# Patient Record
(demographics unavailable — no encounter records)

---

## 2025-05-19 NOTE — HISTORY OF PRESENT ILLNESS
[Born at ___ Wks Gestation] : The patient was born at [unfilled] weeks gestation [] : via normal spontaneous vaginal delivery [(1) _____] : [unfilled] [(5) _____] : [unfilled] [BW: _____] : weight of [unfilled] [DW: _____] : Discharge weight was [unfilled] [Age: ___] : [unfilled] year old mother [G: ___] : G [unfilled] [P: ___] : P [unfilled] [Rubella (Immune)] : Rubella immune [MBT: ____] : MBT - [unfilled] [TcB: _____] : Transcutaneous Bilirubin [unfilled] mg/dL [HepBsAG] : HepBsAg negative [HIV] : HIV negative [GBS] : GBS negative [VDRL/RPR (Reactive)] : VDRL/RPR nonreactive [] : negative [FreeTextEntry9] : O+ [de-identified] : 36 [FreeTextEntry1] : 39.3  routine pregnancy and delivery  mom wanting to breastfeed exclusively feels like maybe milk is coming in more today, pumped 100ml today  mom has been trying to latch baby, pumping, and giving her sister's breast milk (lives at home with family) has had lots of urine/BM in last 24 hours, initially diapers with urate crystals sleeping in bassinet but mom admitting to difficulty getting baby frequently at night for feeds and co-sleeping  takes 30-40ml/feed  mom asking about taking a plane to [place] to stay with her mom for awhile

## 2025-05-19 NOTE — PHYSICAL EXAM
[Alert] : alert [Acute Distress] : no acute distress [Normocephalic] : normocephalic [Flat Open Anterior [place]] : flat open anterior fontanelle [Normally Placed Ears] : normally placed ears [Auricles Well Formed] : auricles well formed [Clear Tympanic membranes] : clear tympanic membranes [Light reflex present] : light reflex present [Bony structures visible] : bony structures visible [Patent Auditory Canal] : patent auditory canal [Discharge] : no discharge [Nares Patent] : nares patent [Palate Intact] : palate intact [Uvula Midline] : uvula midline [Supple, full passive range of motion] : supple, full passive range of motion [Palpable Masses] : no palpable masses [Symmetric Chest Rise] : symmetric chest rise [Clear to Auscultation Bilaterally] : clear to auscultation bilaterally [Regular Rate and Rhythm] : regular rate and rhythm [S1, S2 present] : S1, S2 present [Murmurs] : no murmurs [+2 Femoral Pulses] : +2 femoral pulses [Soft] : soft [Tender] : nontender [Distended] : not distended [Bowel Sounds] : bowel sounds present [Umbilical Stump Dry, Clean, Intact] : umbilical stump dry, clean, intact [Hepatomegaly] : no hepatomegaly [Splenomegaly] : no splenomegaly [Normal external genitailia] : normal external genitalia [Central Urethral Opening] : central urethral opening [Testicles Descended Bilaterally] : testicles descended bilaterally [Patent] : patent [Normally Placed] : normally placed [No Abnormal Lymph Nodes Palpated] : no abnormal lymph nodes palpated [Templeton-Ortolani] : negative Templeton-Ortolani [Symmetric Flexed Extremities] : symmetric flexed extremities [Spinal Dimple] : no spinal dimple [Tuft of Hair] : no tuft of hair [Startle Reflex] : startle reflex present [Suck Reflex] : suck reflex present [Rooting] : rooting reflex present [Palmar Grasp] : palmar grasp present [Plantar Grasp] : plantar reflex present [Symmetric [place]] : symmetric [place] [de-identified] : jaundice, etox

## 2025-05-19 NOTE — DISCUSSION/SUMMARY
[Normal Growth] : growth [Normal Development] : developmental [No Elimination Concerns] : elimination [Continue Regimen] : feeding [No Skin Concerns] : skin [Normal Sleep Pattern] : sleep [None] : no known medical problems [Anticipatory Guidance Given] : Anticipatory guidance addressed as per the history of present illness section [ Transition] :  transition [ Care] :  care [Nutritional Adequacy] : nutritional adequacy [Parental Well-Being] : parental well-being [Safety] : safety [Hepatitis B In Hospital] : Hepatitis B not administered while in the hospital [No Vaccines] : no vaccines needed [No Medications] : ~He/She~ is not on any medications [Parent/Guardian] : Parent/Guardian [FreeTextEntry1] : 4d here for  visit gaining weight from discharge continue ad luis breastfeeding - discussed formula as needed or desired lactation consult tcb 13.4 Recommend exclusive breastfeeding, 8-12 feedings per day. Mother should continue prenatal vitamins and avoid alcohol. If formula is needed, recommend iron-fortified formulations every 2-3 hrs. When in car, patient should be in rear-facing car seat in back seat. Air dry umbilical stump. Put baby to sleep on back, in own crib with no loose or soft bedding. Limit baby's exposure to others, especially those with fever or unknown vaccine status. rto 2 days for recheck

## 2025-05-21 NOTE — HISTORY OF PRESENT ILLNESS
[FreeTextEntry6] : all  breastmilk mom gets about 100ml initial pump in the morning then 40-60 ml throughout the day latching well, mom worried he doesn't get enough when he feeds directly  lots of urine, BM left eye with some discharge and eyelid swelling starting yesterday

## 2025-05-21 NOTE — PHYSICAL EXAM
[NL] : normotonic [FreeTextEntry5] : corner of left eye with scant whitish discharge, conjunctiva clear  [de-identified] : jaundice

## 2025-05-21 NOTE — DISCUSSION/SUMMARY
[FreeTextEntry1] : 6d here for weight check  gaining well feeding well tcb 12.7 trending down  vit d rto 1 week for recheck

## 2025-05-28 NOTE — DISCUSSION/SUMMARY
[FreeTextEntry1] : looks great, past birth weight  continue ad luis feeds mom asking about doing 2mo vaccines early - discussed can do early as 6 weeks, mom wanting to travel to [place] umbilical granuloma cauterized with silver nitrate, tolerated well  vit d rto for 1mo wcc or sooner prn

## 2025-05-28 NOTE — HISTORY OF PRESENT ILLNESS
[FreeTextEntry6] : all breastmilk nursing and taking bottle, 60-80ml per feed every 2 hours, sometimes more often lots of urine/BM sometimes has raspy voice

## 2025-06-07 NOTE — HISTORY OF PRESENT ILLNESS
[FreeTextEntry6] : very uncomfortable trying to pass stool usually stools 3-4x a day, some small stools throughout the day, all very soft  feeds about 90-100ml all breastmilk, all pumped milk every 2 hours (some MOE's breastmilk) very gassy recently for past week up all night crying  has been doing tummy time, leg bicycles, burps well

## 2025-06-07 NOTE — DISCUSSION/SUMMARY
[FreeTextEntry1] : very well appearing gaining weight fobt neg today, will recheck at 1mo visit discussed dyschezia and colic discussed introducing formula as desired